# Patient Record
Sex: FEMALE | Race: BLACK OR AFRICAN AMERICAN | NOT HISPANIC OR LATINO | URBAN - METROPOLITAN AREA
[De-identification: names, ages, dates, MRNs, and addresses within clinical notes are randomized per-mention and may not be internally consistent; named-entity substitution may affect disease eponyms.]

---

## 2018-11-09 ENCOUNTER — INPATIENT (INPATIENT)
Facility: HOSPITAL | Age: 68
LOS: 1 days | Discharge: HOME | End: 2018-11-11
Attending: INTERNAL MEDICINE | Admitting: INTERNAL MEDICINE
Payer: MEDICARE

## 2018-11-09 VITALS
SYSTOLIC BLOOD PRESSURE: 134 MMHG | OXYGEN SATURATION: 100 % | TEMPERATURE: 99 F | RESPIRATION RATE: 16 BRPM | HEART RATE: 128 BPM | DIASTOLIC BLOOD PRESSURE: 68 MMHG

## 2018-11-09 DIAGNOSIS — Z95.828 PRESENCE OF OTHER VASCULAR IMPLANTS AND GRAFTS: Chronic | ICD-10-CM

## 2018-11-09 DIAGNOSIS — I26.99 OTHER PULMONARY EMBOLISM WITHOUT ACUTE COR PULMONALE: ICD-10-CM

## 2018-11-09 DIAGNOSIS — R07.9 CHEST PAIN, UNSPECIFIED: ICD-10-CM

## 2018-11-09 DIAGNOSIS — I10 ESSENTIAL (PRIMARY) HYPERTENSION: ICD-10-CM

## 2018-11-09 DIAGNOSIS — I24.9 ACUTE ISCHEMIC HEART DISEASE, UNSPECIFIED: ICD-10-CM

## 2018-11-09 LAB
ALBUMIN SERPL ELPH-MCNC: 4.5 G/DL — SIGNIFICANT CHANGE UP (ref 3.3–5)
ALP SERPL-CCNC: 102 U/L — SIGNIFICANT CHANGE UP (ref 40–120)
ALT FLD-CCNC: 14 U/L — SIGNIFICANT CHANGE UP (ref 4–33)
APTT BLD: 26.4 SEC — LOW (ref 27.5–36.3)
AST SERPL-CCNC: 23 U/L — SIGNIFICANT CHANGE UP (ref 4–32)
BILIRUB SERPL-MCNC: 0.3 MG/DL — SIGNIFICANT CHANGE UP (ref 0.2–1.2)
BUN SERPL-MCNC: 14 MG/DL — SIGNIFICANT CHANGE UP (ref 7–23)
CALCIUM SERPL-MCNC: 9.6 MG/DL — SIGNIFICANT CHANGE UP (ref 8.4–10.5)
CHLORIDE SERPL-SCNC: 103 MMOL/L — SIGNIFICANT CHANGE UP (ref 98–107)
CO2 SERPL-SCNC: 21 MMOL/L — LOW (ref 22–31)
CREAT SERPL-MCNC: 0.67 MG/DL — SIGNIFICANT CHANGE UP (ref 0.5–1.3)
GLUCOSE SERPL-MCNC: 122 MG/DL — HIGH (ref 70–99)
HCT VFR BLD CALC: 37 % — LOW (ref 39–50)
HGB BLD-MCNC: 11.7 G/DL — LOW (ref 13–17)
INR BLD: 1.04 — SIGNIFICANT CHANGE UP (ref 0.88–1.17)
MCHC RBC-ENTMCNC: 25.3 PG — LOW (ref 27–34)
MCHC RBC-ENTMCNC: 31.6 % — LOW (ref 32–36)
MCV RBC AUTO: 80.1 FL — SIGNIFICANT CHANGE UP (ref 80–100)
NRBC # FLD: 0 — SIGNIFICANT CHANGE UP
PLATELET # BLD AUTO: 201 K/UL — SIGNIFICANT CHANGE UP (ref 150–400)
PMV BLD: 11.2 FL — SIGNIFICANT CHANGE UP (ref 7–13)
POTASSIUM SERPL-MCNC: 4.2 MMOL/L — SIGNIFICANT CHANGE UP (ref 3.5–5.3)
POTASSIUM SERPL-SCNC: 4.2 MMOL/L — SIGNIFICANT CHANGE UP (ref 3.5–5.3)
PROT SERPL-MCNC: 8.4 G/DL — HIGH (ref 6–8.3)
PROTHROM AB SERPL-ACNC: 11.6 SEC — SIGNIFICANT CHANGE UP (ref 9.8–13.1)
RBC # BLD: 4.62 M/UL — SIGNIFICANT CHANGE UP (ref 4.2–5.8)
RBC # FLD: 14.6 % — HIGH (ref 10.3–14.5)
SODIUM SERPL-SCNC: 138 MMOL/L — SIGNIFICANT CHANGE UP (ref 135–145)
TROPONIN T, HIGH SENSITIVITY: 8 NG/L — SIGNIFICANT CHANGE UP (ref ?–14)
TROPONIN T, HIGH SENSITIVITY: 9 NG/L — SIGNIFICANT CHANGE UP (ref ?–14)
TSH SERPL-MCNC: 1.69 UIU/ML — SIGNIFICANT CHANGE UP (ref 0.27–4.2)
WBC # BLD: 6.05 K/UL — SIGNIFICANT CHANGE UP (ref 3.8–10.5)
WBC # FLD AUTO: 6.05 K/UL — SIGNIFICANT CHANGE UP (ref 3.8–10.5)

## 2018-11-09 PROCEDURE — 71275 CT ANGIOGRAPHY CHEST: CPT | Mod: 26

## 2018-11-09 PROCEDURE — 93970 EXTREMITY STUDY: CPT | Mod: 26

## 2018-11-09 RX ORDER — ATORVASTATIN CALCIUM 80 MG/1
20 TABLET, FILM COATED ORAL AT BEDTIME
Qty: 0 | Refills: 0 | Status: DISCONTINUED | OUTPATIENT
Start: 2018-11-09 | End: 2018-11-11

## 2018-11-09 RX ORDER — METOPROLOL TARTRATE 50 MG
25 TABLET ORAL
Qty: 0 | Refills: 0 | Status: DISCONTINUED | OUTPATIENT
Start: 2018-11-09 | End: 2018-11-11

## 2018-11-09 RX ORDER — HYDROCHLOROTHIAZIDE 25 MG
12.5 TABLET ORAL DAILY
Qty: 0 | Refills: 0 | Status: DISCONTINUED | OUTPATIENT
Start: 2018-11-10 | End: 2018-11-11

## 2018-11-09 RX ORDER — ASPIRIN/CALCIUM CARB/MAGNESIUM 324 MG
81 TABLET ORAL DAILY
Qty: 0 | Refills: 0 | Status: DISCONTINUED | OUTPATIENT
Start: 2018-11-09 | End: 2018-11-11

## 2018-11-09 RX ORDER — NIFEDIPINE 30 MG
60 TABLET, EXTENDED RELEASE 24 HR ORAL DAILY
Qty: 0 | Refills: 0 | Status: DISCONTINUED | OUTPATIENT
Start: 2018-11-10 | End: 2018-11-11

## 2018-11-09 RX ORDER — HEPARIN SODIUM 5000 [USP'U]/ML
5000 INJECTION INTRAVENOUS; SUBCUTANEOUS EVERY 8 HOURS
Qty: 0 | Refills: 0 | Status: DISCONTINUED | OUTPATIENT
Start: 2018-11-09 | End: 2018-11-11

## 2018-11-09 RX ADMIN — ATORVASTATIN CALCIUM 20 MILLIGRAM(S): 80 TABLET, FILM COATED ORAL at 21:33

## 2018-11-09 RX ADMIN — HEPARIN SODIUM 5000 UNIT(S): 5000 INJECTION INTRAVENOUS; SUBCUTANEOUS at 21:33

## 2018-11-09 RX ADMIN — Medication 25 MILLIGRAM(S): at 19:41

## 2018-11-09 NOTE — H&P ADULT - ASSESSMENT
69 yo F p/w int palp's x 3-4 days, Rt sternal CP x 1 day & SANTOS x 2 weeks    EKG- ST @ 115 flat T waves v5-6, I, L

## 2018-11-09 NOTE — H&P ADULT - HISTORY OF PRESENT ILLNESS
67 yo F p/w intermittent palp's x 3-4 days. Pt describes it as sometimes fast, sometimes slow, lasting for a few mins at a time. Pt usually feels it while doing something like walking but noticed it at rest as well. Pt also began experiencing Rt sternal CP today, described a dull, 4/10 severity, non-radiating lasting for a few minutes. Pt denies any positional, pleuritic or reproducible component to her CP. [place] some improvement after passing gas. Pt also admits to feeling SANTOS after going up 1 flight of stairs for the last 2 weeks. No orthopnea or SOB at rest. Pt does admit to noticing her BP has been rising over the last 2 days, was up to 158/113 yesterday, when cardiologist increased her nifedipine from 30mg, pt was also prescribed metoprolol for increasing HR states it was as high as 125 this morning.

## 2018-11-09 NOTE — H&P ADULT - NEGATIVE NEUROLOGICAL SYMPTOMS
no weakness/no generalized seizures/no paresthesias/no vertigo/no syncope/no difficulty walking/no focal seizures/no loss of consciousness/no hemiparesis

## 2018-11-09 NOTE — ED ADULT NURSE NOTE - OBJECTIVE STATEMENT
Pt A&Ox3. Came ambulatory to the ED with complaints of palpitations and SANTOS. Pt Sinus tachycardic on the cardiac monitor. Respirations equal, nonlabored, no sign of respiratory distress.  Breath sounds clear bilaterally. Abdomen soft, nontender, no distention noted, no pain. Denies chest pain, N/V/D. Pt denies dizziness, lightheadedness, blurry vision. Pt has history of PE, reports that symptoms feel the same as in the past. Pt stable, family at bedside will reassess.

## 2018-11-09 NOTE — H&P ADULT - PMH
DVT (deep venous thrombosis)  RLE in 2004  HTN (hypertension)    Pulmonary embolism  2004 s/p IVC filter & coumadin x 6 mos

## 2018-11-09 NOTE — H&P ADULT - NEGATIVE ENMT SYMPTOMS
no nasal congestion/no nasal discharge/no hearing difficulty/no sinus symptoms/no ear pain/no dysphagia/no tinnitus/no vertigo

## 2018-11-09 NOTE — CONSULT NOTE ADULT - SUBJECTIVE AND OBJECTIVE BOX
HISTORY OF PRESENT ILLNESS:    68 year old woman with hypertension, hypercholesterolemia, remote pulmonary embolism, who has an IVC filter in place with preserved LV systolic function on echo performed earlier this year which also demonstrated severe left atrial enlargement and no arrhythmias on a cardiac monitor that she wore earlier this year.       PAST MEDICAL & SURGICAL HISTORY:  HTN   HLD  prior PE s/p IVC filter      MEDICATIONS  (STANDING):  AT HOME   Aspirin 81 mg daily, atorvastatin 20 mg QHS, hypercholesterolemia 12.5 mg daily, and nifedipine ER 90 mg daily.       Allergies  No Known Allergies      FAMILY HISTORY:  Non-contributary for premature coronary disease or sudden cardiac death    SOCIAL HISTORY:    [ x] Non-smoker  [ ] Smoker  [x ] Alcohol      REVIEW OF SYSTEMS:  [ ]chest pain  [x  ]shortness of breath  [x  ]palpitations  [  ]syncope  [ ]near syncope [ ]upper extremity weakness   [ ] lower extremity weakness  [  ]diplopia  [  ]altered mental status   [  ]fevers  [ ]chills [ ]nausea  [ ]vomitting  [  ]dysphagia    [ ]abdominal pain  [ ]melena  [ ]BRBPR    [  ]epistaxis  [  ]rash    [ ]lower extremity edema        [x ] All others negative	  [ ] Unable to obtain    PHYSICAL EXAM:  T(C): 37.1 (11-09-18 @ 11:31), Max: 37.1 (11-09-18 @ 11:31)  HR: 128 (11-09-18 @ 11:31) (128 - 128)  BP: 134/68 (11-09-18 @ 11:31) (134/68 - 134/68)  RR: 16 (11-09-18 @ 11:31) (16 - 16)  SpO2: 100% (11-09-18 @ 11:31) (100% - 100%)  Wt(kg): --    Appearance: Normal	  HEENT:   Normal oral mucosa, PERRL, EOMI	  Lymphatic: No lymphadenopathy , no edema  Cardiovascular: Normal S1 S2, No JVD, No murmurs , Peripheral pulses palpable 2+ bilaterally  Respiratory: Lungs clear to auscultation, normal effort 	  Gastrointestinal:  Soft, Non-tender, + BS	  Skin: No rashes, No ecchymoses, No cyanosis, warm to touch  Musculoskeletal: Normal range of motion, normal strength  Psychiatry:  Mood & affect appropriate      TELEMETRY: 	  ECG:  	    Echo:   5/2018   Conclusions:   1. Normal left ventricular size with normal systolic function.   2. Mild diastolic dysfunction.   3. Normal right ventricular size and function.   4. Moderate left atrial enlargement.     NST: Normal exercise nuclear stress test 2016    Cath: n/a  	  	  LABS:	 pending    IMAGING	  CTA chest : pending         ASSESSMENT/PLAN: HISTORY OF PRESENT ILLNESS:    68 year old woman known to our office with hypertension, hypercholesterolemia, remote pulmonary embolism (2004), who has an IVC filter in place (no longer on AC)  with preserved LV systolic function on echo performed earlier this year which also demonstrated severe left atrial enlargement and no arrhythmias on a cardiac monitor that she wore earlier this year, with no ischemia on NST in 2016 and no h/o cad/stents previously presents today with complaints of intermittent palpitations as well as dyspnea on exertion. She states the symptoms have been occurring over the last week. She denies any associated chest pain, syncope or near syncope.        PAST MEDICAL & SURGICAL HISTORY:  HTN   HLD  prior PE s/p IVC filter      MEDICATIONS  (STANDING):  AT HOME   Aspirin 81 mg daily, atorvastatin 20 mg QHS, hypercholesterolemia 12.5 mg daily, and nifedipine ER 90 mg daily.       Allergies  No Known Allergies      FAMILY HISTORY:  Non-contributary for premature coronary disease or sudden cardiac death    SOCIAL HISTORY:    [ x] Non-smoker  [ ] Smoker  [x ] Alcohol      REVIEW OF SYSTEMS:  [ ]chest pain  [x  ]shortness of breath  [x  ]palpitations  [  ]syncope  [ ]near syncope [ ]upper extremity weakness   [ ] lower extremity weakness  [  ]diplopia  [  ]altered mental status   [  ]fevers  [ ]chills [ ]nausea  [ ]vomitting  [  ]dysphagia    [ ]abdominal pain  [ ]melena  [ ]BRBPR    [  ]epistaxis  [  ]rash    [ ]lower extremity edema        [x ] All others negative	  [ ] Unable to obtain    PHYSICAL EXAM:  T(C): 37.1 (11-09-18 @ 11:31), Max: 37.1 (11-09-18 @ 11:31)  HR: 128 (11-09-18 @ 11:31) (128 - 128)  BP: 134/68 (11-09-18 @ 11:31) (134/68 - 134/68)  RR: 16 (11-09-18 @ 11:31) (16 - 16)  SpO2: 100% (11-09-18 @ 11:31) (100% - 100%)  Wt(kg): --    Appearance: Normal	  HEENT:   Normal oral mucosa, PERRL, EOMI	  Lymphatic: No lymphadenopathy , no edema  Cardiovascular: Normal S1 S2, No JVD, No murmurs , Peripheral pulses palpable 2+ bilaterally  Respiratory: Lungs clear to auscultation, normal effort 	  Gastrointestinal:  Soft, Non-tender, + BS	  Skin: No rashes, No ecchymoses, No cyanosis, warm to touch  Musculoskeletal: Normal range of motion, normal strength  Psychiatry:  Mood & affect appropriate      TELEMETRY: 	NSR/-120s  ECG:  	ST no acute ischemia     Echo:   5/2018   Conclusions:   1. Normal left ventricular size with normal systolic function.   2. Mild diastolic dysfunction.   3. Normal right ventricular size and function.   4. Moderate left atrial enlargement.     NST: Normal exercise nuclear stress test 2016    Cath: n/a  	  	  LABS:	 pending    IMAGING	  CTA chest : pending         ASSESSMENT/PLAN:  68 year old woman known to our office with hypertension, hypercholesterolemia, remote pulmonary embolism (2004), who has an IVC filter in place (no longer on AC)  with preserved LV systolic function on echo performed earlier this year which also demonstrated severe left atrial enlargement and no arrhythmias on a cardiac monitor that she wore earlier this year, with no ischemia on NST in 2016 and no h/o cad/stents previously presents today with complaints of intermittent palpitations as well as dyspnea on exertion. She states the symptoms have been occurring over the last week. She denies any associated chest pain, syncope or near syncope.        -- follow up labs especially troponins and tsh  -- CTA chest pending to r/o PE given symptoms and sinus tach on admit  -- check BL LE dopplers  -- if thromboembolic work up negative, will check TTE/NST to r/o CAD  -- d/w patient/daughter at bedside  -- f/u with Dr Sage upon DC

## 2018-11-09 NOTE — ED PROVIDER NOTE - ENMT, MLM
Airway patent, Nasal mucosa clear. Mouth with normal mucosa. Throat has no vesicles, no oropharyngeal exudates and uvula is midline. no thyromegaly

## 2018-11-09 NOTE — ED ADULT NURSE REASSESSMENT NOTE - NS ED NURSE REASSESS COMMENT FT1
Pt stable, laying in bed comfortably, daughter at bedside.  Pt states palpitations aren't as bad as they were when she came in. Respirations equal, nonlabored, no sign of respiratory distress. Denies SOB. Pt going to CDU. Report given to YELENA Leal.

## 2018-11-09 NOTE — H&P ADULT - CARDIOVASCULAR SYMPTOMS
palpitations/See HPI, h/o chronic intermittent RLE edema since she had a DVT in 2004, worse with prolonged standing/chest pain/peripheral edema/dyspnea on exertion

## 2018-11-09 NOTE — ED PROVIDER NOTE - OBJECTIVE STATEMENT
68 year c/o palpitations for 3 days, occasional left sided cp, no SOB, No fever chills. Hx of PE that presented similiarly, though after accident so was immobilized. No recent travel, immobilization, cancer. On nifedipine, HCTZ lipitor. Drinks 1 cup coffee a day, though not today. no weight loss or heat intolerance.

## 2018-11-09 NOTE — H&P ADULT - PROBLEM SELECTOR PLAN 1
Admit to Telemetry, serial CE's, EKG's, FLP, continue ASA, statin, start metoprolol F/U Cardiology note, check Echo, NST

## 2018-11-10 LAB
ALBUMIN SERPL ELPH-MCNC: 3.8 G/DL — SIGNIFICANT CHANGE UP (ref 3.3–5)
ALP SERPL-CCNC: 83 U/L — SIGNIFICANT CHANGE UP (ref 40–120)
ALT FLD-CCNC: 12 U/L — SIGNIFICANT CHANGE UP (ref 4–33)
AST SERPL-CCNC: 15 U/L — SIGNIFICANT CHANGE UP (ref 4–32)
BASOPHILS # BLD AUTO: 0.02 K/UL — SIGNIFICANT CHANGE UP (ref 0–0.2)
BASOPHILS NFR BLD AUTO: 0.3 % — SIGNIFICANT CHANGE UP (ref 0–2)
BILIRUB SERPL-MCNC: 0.3 MG/DL — SIGNIFICANT CHANGE UP (ref 0.2–1.2)
BUN SERPL-MCNC: 12 MG/DL — SIGNIFICANT CHANGE UP (ref 7–23)
CALCIUM SERPL-MCNC: 9.5 MG/DL — SIGNIFICANT CHANGE UP (ref 8.4–10.5)
CHLORIDE SERPL-SCNC: 102 MMOL/L — SIGNIFICANT CHANGE UP (ref 98–107)
CHOLEST SERPL-MCNC: 238 MG/DL — HIGH (ref 120–199)
CO2 SERPL-SCNC: 22 MMOL/L — SIGNIFICANT CHANGE UP (ref 22–31)
CREAT SERPL-MCNC: 0.63 MG/DL — SIGNIFICANT CHANGE UP (ref 0.5–1.3)
EOSINOPHIL # BLD AUTO: 0.08 K/UL — SIGNIFICANT CHANGE UP (ref 0–0.5)
EOSINOPHIL NFR BLD AUTO: 1.2 % — SIGNIFICANT CHANGE UP (ref 0–6)
GLUCOSE SERPL-MCNC: 86 MG/DL — SIGNIFICANT CHANGE UP (ref 70–99)
HBA1C BLD-MCNC: 5.5 % — SIGNIFICANT CHANGE UP (ref 4–5.6)
HCT VFR BLD CALC: 33.1 % — LOW (ref 34.5–45)
HDLC SERPL-MCNC: 80 MG/DL — HIGH (ref 45–65)
HGB BLD-MCNC: 10.7 G/DL — LOW (ref 11.5–15.5)
IMM GRANULOCYTES # BLD AUTO: 0.01 # — SIGNIFICANT CHANGE UP
IMM GRANULOCYTES NFR BLD AUTO: 0.1 % — SIGNIFICANT CHANGE UP (ref 0–1.5)
LIPID PNL WITH DIRECT LDL SERPL: 164 MG/DL — SIGNIFICANT CHANGE UP
LYMPHOCYTES # BLD AUTO: 3.45 K/UL — HIGH (ref 1–3.3)
LYMPHOCYTES # BLD AUTO: 51.5 % — HIGH (ref 13–44)
MAGNESIUM SERPL-MCNC: 2.3 MG/DL — SIGNIFICANT CHANGE UP (ref 1.6–2.6)
MCHC RBC-ENTMCNC: 26 PG — LOW (ref 27–34)
MCHC RBC-ENTMCNC: 32.3 % — SIGNIFICANT CHANGE UP (ref 32–36)
MCV RBC AUTO: 80.5 FL — SIGNIFICANT CHANGE UP (ref 80–100)
MONOCYTES # BLD AUTO: 0.6 K/UL — SIGNIFICANT CHANGE UP (ref 0–0.9)
MONOCYTES NFR BLD AUTO: 9 % — SIGNIFICANT CHANGE UP (ref 2–14)
NEUTROPHILS # BLD AUTO: 2.54 K/UL — SIGNIFICANT CHANGE UP (ref 1.8–7.4)
NEUTROPHILS NFR BLD AUTO: 37.9 % — LOW (ref 43–77)
NRBC # FLD: 0 — SIGNIFICANT CHANGE UP
PHOSPHATE SERPL-MCNC: 3.7 MG/DL — SIGNIFICANT CHANGE UP (ref 2.5–4.5)
PLATELET # BLD AUTO: 189 K/UL — SIGNIFICANT CHANGE UP (ref 150–400)
PMV BLD: 11.4 FL — SIGNIFICANT CHANGE UP (ref 7–13)
POTASSIUM SERPL-MCNC: 4.2 MMOL/L — SIGNIFICANT CHANGE UP (ref 3.5–5.3)
POTASSIUM SERPL-SCNC: 4.2 MMOL/L — SIGNIFICANT CHANGE UP (ref 3.5–5.3)
PROT SERPL-MCNC: 6.8 G/DL — SIGNIFICANT CHANGE UP (ref 6–8.3)
RBC # BLD: 4.11 M/UL — SIGNIFICANT CHANGE UP (ref 3.8–5.2)
RBC # FLD: 14.7 % — HIGH (ref 10.3–14.5)
SODIUM SERPL-SCNC: 139 MMOL/L — SIGNIFICANT CHANGE UP (ref 135–145)
T4 FREE SERPL-MCNC: 1.19 NG/DL — SIGNIFICANT CHANGE UP (ref 0.9–1.8)
TRIGL SERPL-MCNC: 61 MG/DL — SIGNIFICANT CHANGE UP (ref 10–149)
TROPONIN T, HIGH SENSITIVITY: 12 NG/L — SIGNIFICANT CHANGE UP (ref ?–14)
TSH SERPL-MCNC: 1.29 UIU/ML — SIGNIFICANT CHANGE UP (ref 0.27–4.2)
WBC # BLD: 6.7 K/UL — SIGNIFICANT CHANGE UP (ref 3.8–10.5)
WBC # FLD AUTO: 6.7 K/UL — SIGNIFICANT CHANGE UP (ref 3.8–10.5)

## 2018-11-10 RX ADMIN — HEPARIN SODIUM 5000 UNIT(S): 5000 INJECTION INTRAVENOUS; SUBCUTANEOUS at 05:22

## 2018-11-10 RX ADMIN — Medication 12.5 MILLIGRAM(S): at 05:22

## 2018-11-10 RX ADMIN — Medication 25 MILLIGRAM(S): at 17:48

## 2018-11-10 RX ADMIN — HEPARIN SODIUM 5000 UNIT(S): 5000 INJECTION INTRAVENOUS; SUBCUTANEOUS at 21:38

## 2018-11-10 RX ADMIN — Medication 60 MILLIGRAM(S): at 05:21

## 2018-11-10 RX ADMIN — ATORVASTATIN CALCIUM 20 MILLIGRAM(S): 80 TABLET, FILM COATED ORAL at 21:38

## 2018-11-10 RX ADMIN — Medication 81 MILLIGRAM(S): at 17:48

## 2018-11-10 RX ADMIN — Medication 25 MILLIGRAM(S): at 05:21

## 2018-11-10 NOTE — PROGRESS NOTE ADULT - SUBJECTIVE AND OBJECTIVE BOX
Patient denies chest pain or shortness of breath.   Review of systems otherwise (-)  	  MEDICATIONS:  MEDICATIONS  (STANDING):  aspirin enteric coated 81 milliGRAM(s) Oral daily  atorvastatin 20 milliGRAM(s) Oral at bedtime  heparin  Injectable 5000 Unit(s) SubCutaneous every 8 hours  hydrochlorothiazide 12.5 milliGRAM(s) Oral daily  metoprolol tartrate 25 milliGRAM(s) Oral two times a day  NIFEdipine XL 60 milliGRAM(s) Oral daily      LABS:	 	    CARDIAC MARKERS:                                10.7   6.70  )-----------( 189      ( 10 Nov 2018 02:50 )             33.1     Hemoglobin: 10.7 g/dL (11-10 @ 02:50)  Hemoglobin: 11.7 g/dL (11-09 @ 12:18)      11-10    139  |  102  |  12  ----------------------------<  86  4.2   |  22  |  0.63    Ca    9.5      10 Nov 2018 03:39  Phos  3.7     11-10  Mg     2.3     11-10    TPro  6.8  /  Alb  3.8  /  TBili  0.3  /  DBili  x   /  AST  15  /  ALT  12  /  AlkPhos  83  11-10    Creatinine Trend: 0.63<--, 0.67<--    COAGS:       proBNP:   Lipid Profile:   HgA1c: Hemoglobin A1C, Whole Blood: 5.5 % (11-10 @ 02:50)    TSH: Thyroid Stimulating Hormone, Serum: 1.29 uIU/mL (11-10 @ 03:39)  Thyroid Stimulating Hormone, Serum: 1.69 uIU/mL (11-09 @ 12:18)        PHYSICAL EXAM:  T(C): 36.6 (11-10-18 @ 05:20), Max: 37 (11-09-18 @ 15:44)  HR: 87 (11-10-18 @ 05:20) (80 - 108)  BP: 106/64 (11-10-18 @ 05:20) (106/64 - 183/105)  RR: 18 (11-10-18 @ 05:20) (18 - 23)  SpO2: 98% (11-10-18 @ 05:20) (98% - 100%)  Wt(kg): --  I&O's Summary    Height (cm): 154.94 (11-09 @ 16:59)  Weight (kg): 82.1 (11-09 @ 16:59)  BMI (kg/m2): 34.2 (11-09 @ 16:59)  BSA (m2): 1.81 (11-09 @ 16:59)    Gen: Appears well in NAD  HEENT:  (-)icterus (-)pallor  CV: N S1 S2 1/6 TIN (+)2 Pulses B/l  Resp:  Clear to ausculatation B/L, normal effort  GI: (+) BS Soft, NT, ND  Lymph:  (-)Edema, (-)obvious lymphadenopathy  Skin: Warm to touch, Normal turgor  Psych: Appropriate mood and affect      TELEMETRY: 	  Sinus, Sinus         ASSESSMENT/PLAN: 	68y  Female known to our office with hypertension, hypercholesterolemia, remote pulmonary embolism (2004), who has an IVC filter in place (no longer on AC)  with preserved LV systolic function on echo performed earlier this year which also demonstrated severe left atrial enlargement and no arrhythmias on a cardiac monitor that she wore earlier this year, with no ischemia on NST in 2016 and no h/o cad/stents previously presents today with complaints of intermittent palpitations as well as dyspnea on exertion. She states the symptoms have been occurring over the last week. She denies any associated chest pain, syncope or near syncope.        -- follow up labs especially troponins and tsh  -- CTA chest negative  --  BL LE dopplers (-)  -- if thromboembolic work up negative, will check TTE/NST to r/o CAD    Enrico Sparks MD, FACC

## 2018-11-11 VITALS
HEART RATE: 60 BPM | DIASTOLIC BLOOD PRESSURE: 68 MMHG | SYSTOLIC BLOOD PRESSURE: 115 MMHG | RESPIRATION RATE: 18 BRPM | OXYGEN SATURATION: 100 %

## 2018-11-11 DIAGNOSIS — R91.1 SOLITARY PULMONARY NODULE: ICD-10-CM

## 2018-11-11 PROCEDURE — 93018 CV STRESS TEST I&R ONLY: CPT | Mod: GC

## 2018-11-11 PROCEDURE — 78452 HT MUSCLE IMAGE SPECT MULT: CPT | Mod: 26

## 2018-11-11 PROCEDURE — 93306 TTE W/DOPPLER COMPLETE: CPT | Mod: 26

## 2018-11-11 PROCEDURE — 93016 CV STRESS TEST SUPVJ ONLY: CPT | Mod: GC

## 2018-11-11 RX ORDER — ASPIRIN/CALCIUM CARB/MAGNESIUM 324 MG
1 TABLET ORAL
Qty: 0 | Refills: 0 | COMMUNITY
Start: 2018-11-11

## 2018-11-11 RX ORDER — ATORVASTATIN CALCIUM 80 MG/1
1 TABLET, FILM COATED ORAL
Qty: 0 | Refills: 0 | COMMUNITY
Start: 2018-11-11

## 2018-11-11 RX ORDER — METOPROLOL TARTRATE 50 MG
1 TABLET ORAL
Qty: 60 | Refills: 0 | OUTPATIENT
Start: 2018-11-11 | End: 2018-12-10

## 2018-11-11 RX ORDER — ATORVASTATIN CALCIUM 80 MG/1
1 TABLET, FILM COATED ORAL
Qty: 0 | Refills: 0 | COMMUNITY

## 2018-11-11 RX ORDER — NIFEDIPINE 30 MG
1 TABLET, EXTENDED RELEASE 24 HR ORAL
Qty: 0 | Refills: 0 | COMMUNITY
Start: 2018-11-11

## 2018-11-11 RX ORDER — ASPIRIN/CALCIUM CARB/MAGNESIUM 324 MG
1 TABLET ORAL
Qty: 0 | Refills: 0 | COMMUNITY

## 2018-11-11 RX ORDER — NIFEDIPINE 30 MG
1 TABLET, EXTENDED RELEASE 24 HR ORAL
Qty: 0 | Refills: 0 | COMMUNITY

## 2018-11-11 RX ADMIN — Medication 60 MILLIGRAM(S): at 11:28

## 2018-11-11 RX ADMIN — HEPARIN SODIUM 5000 UNIT(S): 5000 INJECTION INTRAVENOUS; SUBCUTANEOUS at 05:28

## 2018-11-11 RX ADMIN — Medication 81 MILLIGRAM(S): at 11:27

## 2018-11-11 RX ADMIN — Medication 12.5 MILLIGRAM(S): at 11:27

## 2018-11-11 RX ADMIN — HEPARIN SODIUM 5000 UNIT(S): 5000 INJECTION INTRAVENOUS; SUBCUTANEOUS at 14:22

## 2018-11-11 RX ADMIN — Medication 25 MILLIGRAM(S): at 17:15

## 2018-11-11 NOTE — DISCHARGE NOTE ADULT - PLAN OF CARE
To maintain a normal blood pressure to prevent heart attack, stroke and renal failure. Low sodium and fat diet, continue anti-hypertensive medications, and follow up with primary care physician. prevent heart disease continue present medications   f/u with cardiology in 1 week  low salt, low fat diet

## 2018-11-11 NOTE — CONSULT NOTE ADULT - SUBJECTIVE AND OBJECTIVE BOX
Patient is a 68y old  Female who presents with a chief complaint of dyspnea (09 Nov 2018 12:08)    HPI:  67 yo F p/w intermittent palp's x 3-4 days. Pt describes it as sometimes fast, sometimes slow, lasting for a few mins at a time. Pt usually feels it while doing something like walking but noticed it at rest as well. Pt also began experiencing Rt sternal CP today, described a dull, 4/10 severity, non-radiating lasting for a few minutes. Pt denies any positional, pleuritic or reproducible component to her CP. [place] some improvement after passing gas. Pt also admits to feeling SANTOS after going up 1 flight of stairs for the last 2 weeks. No orthopnea or SOB at rest. Pt does admit to noticing her BP has been rising over the last 2 days, was up to 158/113 yesterday, when cardiologist increased her nifedipine from 30mg, pt was also prescribed metoprolol for increasing HR states it was as high as 125 this morning. (09 Nov 2018 16:59)      PAST MEDICAL & SURGICAL HISTORY:  DVT (deep venous thrombosis): RLE in 2004  Pulmonary embolism: 2004 s/p IVC filter &amp; coumadin x 6 mos  HTN (hypertension)  S/P IVC filter: 2004      Review of Systems:   CONSTITUTIONAL: No fever, weight loss, or fatigue  EYES: No eye pain, visual disturbances, or discharge  ENMT:  No difficulty hearing, tinnitus, vertigo; No sinus or throat pain  NECK: No pain or stiffness  RESPIRATORY: No cough, wheezing, chills or hemoptysis; No shortness of breath  CARDIOVASCULAR: see above HPI   GASTROINTESTINAL: No abdominal or epigastric pain. No nausea, vomiting, or hematemesis; No diarrhea or constipation. No melena or hematochezia.  GENITOURINARY: No dysuria, frequency, hematuria, or incontinence  NEUROLOGICAL: No headaches, memory loss, loss of strength, numbness, or tremors  SKIN: No itching, burning, rashes, or lesions   LYMPH NODES: No enlarged glands  ENDOCRINE: No heat or cold intolerance; No hair loss  MUSCULOSKELETAL: No joint pain or swelling; No muscle, back, or extremity pain  PSYCHIATRIC: No depression, anxiety, mood swings, or difficulty sleeping  HEME/LYMPH: No easy bruising, or bleeding gums  ALLERGY AND IMMUNOLOGIC: No hives or eczema    Allergies    No Known Allergies      Social History: non smoker  no IVDA  no ETOH abuse   lives with daughter     FAMILY HISTORY:      MEDICATIONS  (STANDING):  aspirin enteric coated 81 milliGRAM(s) Oral daily  atorvastatin 20 milliGRAM(s) Oral at bedtime  heparin  Injectable 5000 Unit(s) SubCutaneous every 8 hours  hydrochlorothiazide 12.5 milliGRAM(s) Oral daily  metoprolol tartrate 25 milliGRAM(s) Oral two times a day  NIFEdipine XL 60 milliGRAM(s) Oral daily    MEDICATIONS  (PRN):      I&O's Summary    10 Nov 2018 07:01  -  11 Nov 2018 07:00  --------------------------------------------------------  IN: 360 mL / OUT: 0 mL / NET: 360 mL        PHYSICAL EXAM:  GENERAL: NAD, well-developed  HEAD:  Atraumatic, Normocephalic  EYES: EOMI, PERRLA, conjunctiva and sclera clear  NECK: Supple, No JVD  CHEST/LUNG: Clear to auscultation bilaterally; No wheeze  HEART: S1S2; soft ejection systolic murmur best heard at left sternal border no rubs, or gallops  ABDOMEN: Soft, Nontender, Nondistended; Bowel sounds present  EXTREMITIES:  + Peripheral Pulses, No clubbing or cyanosis, no edema  PSYCH: AO x 3,   NEUROLOGY: Alert, no focal motor or sensory deficits  SKIN: No rashes or lesions    LABS:                        10.7   6.70  )-----------( 189      ( 10 Nov 2018 02:50 )             33.1     11-10    139  |  102  |  12  ----------------------------<  86  4.2   |  22  |  0.63    Ca    9.5      10 Nov 2018 03:39  Phos  3.7     11-10  Mg     2.3     11-10    TPro  6.8  /  Alb  3.8  /  TBili  0.3  /  DBili  x   /  AST  15  /  ALT  12  /  AlkPhos  83  11-10    PT/INR - ( 09 Nov 2018 12:18 )   PT: 11.6 SEC;   INR: 1.04          PTT - ( 09 Nov 2018 12:18 )  PTT:26.4 SEC          RADIOLOGY & ADDITIONAL TESTS:    Consultant(s) Notes Reviewed:      Care Discussed with Consultants/Other Providers:

## 2018-11-11 NOTE — DISCHARGE NOTE ADULT - HOSPITAL COURSE
69 yo F p/w intermittent palp's x 3-4 days. Pt describes it as sometimes fast, sometimes slow, lasting for a few mins at a time. Pt usually feels it while doing something like walking but noticed it at rest as well. Pt also began experiencing Rt sternal CP today, described a dull, 4/10 severity, non-radiating lasting for a few minutes. Pt denies any positional, pleuritic or reproducible component to her CP. [place] some improvement after passing gas. Pt also admits to feeling SANTOS after going up 1 flight of stairs for the last 2 weeks. No orthopnea or SOB at rest. Pt does admit to noticing her BP has been rising over the last 2 days, was up to 158/113 yesterday, when cardiologist increased her nifedipine from 30mg, pt was also prescribed metoprolol for increasing HR states it was as high as 125 this morning.   EKG- ST @ 115 Flat T waves v5-6, I, L  CTPA- No pulmonary emboli within the main lobar and segmental branches of the pulmonary arteries. Some of the subsegmental branches are not evaluated secondary to motion artifact. No aortic aneurysm or dissection. Coronary atherosclerosis. Nonspecific 4 mm pulmonary nodule in the right upper lobe. No follow-up is required in patient without risk factors.  Trop 8  H/H- 11.7/ 37  dopplers negativr   ECHO AND STERSS TEST - NEGATIVE  STABLE FOR D/C HOME TODAY

## 2018-11-11 NOTE — DISCHARGE NOTE ADULT - CARE PLAN
Principal Discharge DX:	Atypical chest pain  Goal:	prevent heart disease  Assessment and plan of treatment:	continue present medications   f/u with cardiology in 1 week  low salt, low fat diet  Secondary Diagnosis:	Essential hypertension  Goal:	To maintain a normal blood pressure to prevent heart attack, stroke and renal failure.  Assessment and plan of treatment:	Low sodium and fat diet, continue anti-hypertensive medications, and follow up with primary care physician.

## 2018-11-11 NOTE — PROGRESS NOTE ADULT - SUBJECTIVE AND OBJECTIVE BOX
Patient denies chest pain or shortness of breath.   Review of systems otherwise (-)  	  MEDICATIONS  (STANDING):  aspirin enteric coated 81 milliGRAM(s) Oral daily  atorvastatin 20 milliGRAM(s) Oral at bedtime  heparin  Injectable 5000 Unit(s) SubCutaneous every 8 hours  hydrochlorothiazide 12.5 milliGRAM(s) Oral daily  metoprolol tartrate 25 milliGRAM(s) Oral two times a day  NIFEdipine XL 60 milliGRAM(s) Oral daily    LABS:                        10.7   6.70  )-----------( 189      ( 10 Nov 2018 02:50 )             33.1     139  |  102  |  12  ----------------------------<  86  4.2   |  22  |  0.63    Ca    9.5      10 Nov 2018 03:39  Phos  3.7     11-10  Mg     2.3     11-10    TPro  6.8  /  Alb  3.8  /  TBili  0.3  /  DBili  x   /  AST  15  /  ALT  12  /  AlkPhos  83  11-10    Creatinine Trend: 0.63<--, 0.67<--       PHYSICAL EXAM  Vital Signs Last 24 Hrs  T(C): 36.7 (11 Nov 2018 05:18), Max: 37.1 (10 Nov 2018 19:32)  T(F): 98 (11 Nov 2018 05:18), Max: 98.8 (10 Nov 2018 19:32)  HR: 86 (11 Nov 2018 13:16) (53 - 88)  BP: 133/93 (11 Nov 2018 13:16) (109/53 - 154/103)  RR: 18 (11 Nov 2018 13:16) (18 - 18)  SpO2: 100% (11 Nov 2018 13:16) (98% - 100%)    Gen: Appears well in NAD  HEENT:  (-)icterus (-)pallor  CV: N S1 S2 1/6 TIN (+)2 Pulses B/l  Resp:  Clear to ausculatation B/L, normal effort  GI: (+) BS Soft, NT, ND  Lymph:  (-)Edema, (-)obvious lymphadenopathy  Skin: Warm to touch, Normal turgor  Psych: Appropriate mood and affect    DATA:    TELEMETRY: 	  Sinus, Sinus     Trop T: 8, 9    < from: VA Duplex Ext Veins Lower Comp, Bilat. (11.09.18 @ 16:15) >  Summary/Impressions:  1.  No evidenceof deep venous thrombosis in the right and  left lower extremities.  2.  No evidence of deep and superficial venous  insufficiency noted in the right and left lower  extremities.  ------------------------------------------------------------------------  Confirmed on  11/9/2018 - 5:44 PM by SALENA Thompson    < end of copied text >    < from: CT Angio Chest w/ IV Cont (11.09.18 @ 14:22) >  IMPRESSION:     No pulmonary emboli within the main lobar and segmental branches of the   pulmonary arteries. Some of the subsegmental branches are not evaluated   secondary to motion artifact.    No aortic aneurysm or dissection.    Coronary atherosclerosis.    Nonspecific 4 mm pulmonary nodule in the right upper lobe. No follow-up   is required in patient without risk factors.    < end of copied text >        ASSESSMENT/PLAN: 	68 year old Female known to our office with hypertension, hypercholesterolemia, remote pulmonary embolism (2004), who has an IVC filter in place (no longer on AC)  with preserved LV systolic function on echo performed earlier this year which also demonstrated severe left atrial enlargement and no arrhythmias on a cardiac monitor that she wore earlier this year, with no ischemia on NST in 2016 and no h/o cad/stents previously presents today with complaints of intermittent palpitations as well as dyspnea on exertion. She states the symptoms have been occurring over the last week. She denies any associated chest pain, syncope or near syncope.      --ACS ruled out  --TSH WNL  --CTA chest negative for PE but with coronary atherosclerosis and a RUL 4 mm nodule.  Will check TTE and NST and obtain Pulm Eval (Dr Chilel)

## 2018-11-11 NOTE — CONSULT NOTE ADULT - ATTENDING COMMENTS
Patient seen and examined, agree with above assessment and plan as transcribed above.    - CTPA (-) PE  - Plan for echo and stress    Enrico Sparks MD, FACC
discussed with patient in detail, all questions answered

## 2018-11-11 NOTE — CONSULT NOTE ADULT - PROBLEM SELECTOR RECOMMENDATION 9
acceptable  continue to monitor  continue HCTZ, Nifedipine and metoprolol with hold parameters
4 mm right upper nodule: in a patient with no hx of lung disease, no cancers as well as a non smoker: Outpt follow up !

## 2018-11-11 NOTE — DISCHARGE NOTE ADULT - MEDICATION SUMMARY - MEDICATIONS TO TAKE
I will START or STAY ON the medications listed below when I get home from the hospital:    aspirin 81 mg oral delayed release tablet  -- 1 tab(s) by mouth once a day  -- Indication: For DVT (deep venous thrombosis)    atorvastatin 20 mg oral tablet  -- 1 tab(s) by mouth once a day (at bedtime)  -- Indication: For Cholesterol    metoprolol tartrate 25 mg oral tablet  -- 1 tab(s) by mouth 2 times a day  -- Indication: For HTN (hypertension)    NIFEdipine 60 mg oral tablet, extended release  -- 1 tab(s) by mouth once a day  -- Indication: For HTN (hypertension)    hydroCHLOROthiazide 12.5 mg oral capsule  -- 1 cap(s) by mouth once a day  -- Indication: For HTN (hypertension)

## 2018-11-11 NOTE — DISCHARGE NOTE ADULT - PATIENT PORTAL LINK FT
You can access the [facility] Patient Portal, offered by [facility] [place], by registering with the following website: [email or web address]

## 2018-11-11 NOTE — CONSULT NOTE ADULT - ASSESSMENT
69 yo F with a PMH DT, PE and HTN admitted with shortness of breath and palpitations x 3-4 days
67 yo F p/w int palp's x 3-4 days, Rt sternal CP x 1 day & SANTOS x 2 weeks    EKG- ST @ 115 flat T waves v5-6, I, L

## 2018-11-11 NOTE — CONSULT NOTE ADULT - SUBJECTIVE AND OBJECTIVE BOX
Patient is a 68y old  Female who presents with a chief complaint of palpitations and shortness of breath (11 Nov 2018 12:03)      HPI:  69 yo F p/w intermittent palp's x 3-4 days. Pt describes it as sometimes fast, sometimes slow, lasting for a few mins at a time. Pt usually feels it while doing something like walking but noticed it at rest as well. Pt also began experiencing Rt sternal CP today, described a dull, 4/10 severity, non-radiating lasting for a few minutes. Pt denies any positional, pleuritic or reproducible component to her CP. [place] some improvement after passing gas. Pt also admits to feeling SANTOS after going up 1 flight of stairs for the last 2 weeks. No orthopnea or SOB at rest. Pt does admit to noticing her BP has been rising over the last 2 days, was up to 158/113 yesterday, when cardiologist increased her nifedipine from 30mg, pt was also prescribed metoprolol for increasing HR states it was as high as 125 this morning. (09 Nov 2018 16:59)    She was found to hcve pulmonary nodule incidentally on ctc hest hence pulmonary called She has no underlying pulmonary diseases and denies having cough, fever or SOB : She has no underlying cancers too!!      ?FOLLOWING PRESENT  [ x] Hx of PE/DVT, [ x] Hx COPD, [ x] Hx of Asthma, [ x] Hx of Hospitalization, [x ]  Hx of BiPAP/CPAP use, [ x] Hx of GLENNA    Allergies    No Known Allergies    Intolerances        PAST MEDICAL & SURGICAL HISTORY:  DVT (deep venous thrombosis): RLE in 2004  Pulmonary embolism: 2004 s/p IVC filter &amp; coumadin x 6 mos  HTN (hypertension)  S/P IVC filter: 2004      FAMILY HISTORY:      Social History: [ x ] TOBACCO                  [ x ] ETOH                                 [ x ] IVDA/DRUGS    REVIEW OF SYSTEMS      General:	x    Skin/Breast:x  	  Ophthalmologic:x  	  ENMT:	x    Respiratory and Thorax:x  	  Cardiovascular:	Palpitations    Gastrointestinal:	x    Genitourinary:	x    Musculoskeletal:	x    Neurological:	x    Psychiatric:	x    Hematology/Lymphatics:	x    Endocrine:	x    Allergic/Immunologic:	x    MEDICATIONS  (STANDING):  aspirin enteric coated 81 milliGRAM(s) Oral daily  atorvastatin 20 milliGRAM(s) Oral at bedtime  heparin  Injectable 5000 Unit(s) SubCutaneous every 8 hours  hydrochlorothiazide 12.5 milliGRAM(s) Oral daily  metoprolol tartrate 25 milliGRAM(s) Oral two times a day  NIFEdipine XL 60 milliGRAM(s) Oral daily    MEDICATIONS  (PRN):       Vital Signs Last 24 Hrs  T(C): 36.7 (11 Nov 2018 05:18), Max: 37.1 (10 Nov 2018 19:32)  T(F): 98 (11 Nov 2018 05:18), Max: 98.8 (10 Nov 2018 19:32)  HR: 86 (11 Nov 2018 13:16) (53 - 88)  BP: 133/93 (11 Nov 2018 13:16) (109/53 - 154/103)  BP(mean): --  RR: 18 (11 Nov 2018 13:16) (18 - 18)  SpO2: 100% (11 Nov 2018 13:16) (98% - 100%)        I&O's Summary    10 Nov 2018 07:01  -  11 Nov 2018 07:00  --------------------------------------------------------  IN: 360 mL / OUT: 0 mL / NET: 360 mL        Physical Exam:   GENERAL: NAD, well-groomed, well-developed  HEENT: ROXANA/   Atraumatic, Normocephalic  ENMT: No tonsillar erythema, exudates, or enlargement; Moist mucous membranes, Good dentition, No lesions  NECK: Supple, No JVD, Normal thyroid  CHEST/LUNG: Clear to auscultation bilaterally; No rales, rhonchi, wheezing, or rubs  CVS: Regular rate and rhythm; No murmurs, rubs, or gallops  GI: : Soft, Nontender, Nondistended; Bowel sounds present  NERVOUS SYSTEM:  Alert & Oriented X3  EXTREMITIES:  2+ Peripheral Pulses, No clubbing, cyanosis, or edema  LYMPH: No lymphadenopathy noted  SKIN: No rashes or lesions  ENDOCRINOLOGY: No Thyromegaly  PSYCH: Appropriate    Labs:                              10.7   6.70  )-----------( 189      ( 10 Nov 2018 02:50 )             33.1                         11.7   6.05  )-----------( 201      ( 09 Nov 2018 12:18 )             37.0     11-10    139  |  102  |  12  ----------------------------<  86  4.2   |  22  |  0.63  11-09    138  |  103  |  14  ----------------------------<  122<H>  4.2   |  21<L>  |  0.67    Ca    9.5      10 Nov 2018 03:39  Phos  3.7     11-10  Mg     2.3     11-10    TPro  6.8  /  Alb  3.8  /  TBili  0.3  /  DBili  x   /  AST  15  /  ALT  12  /  AlkPhos  83  11-10  TPro  8.4<H>  /  Alb  4.5  /  TBili  0.3  /  DBili  x   /  AST  23  /  ALT  14  /  AlkPhos  102  11-09    CAPILLARY BLOOD GLUCOSE        LIVER FUNCTIONS - ( 10 Nov 2018 03:39 )  Alb: 3.8 g/dL / Pro: 6.8 g/dL / ALK PHOS: 83 u/L / ALT: 12 u/L / AST: 15 u/L / GGT: x               D DImer      Studies  Chest X-RAY  CT SCAN Chest   CT Abdomen  Venous Dopplers: LE:   Others    < from: CT Angio Chest w/ IV Cont (11.09.18 @ 14:22) >  diameter.    The descending aorta is tortuous at the level of the diaphragmatic. No   aortic aneurysm or dissection.    HEART: The heart is mildly enlarged. Mild left atrial rotation. No   pericardial effusion. Mild calcific atherosclerosis of LAD. No CT   evidence of right heart strain.    MEDIASTINUM AND KE: No lymphadenopathy.    CHEST WALL AND LOWER NECK: Within normal limits.    VISUALIZED UPPER ABDOMEN: Partially visualized IVC filter.    BONES: Scoliosis.    IMPRESSION:     No pulmonary emboli within the main lobar and segmental branches of the   pulmonary arteries. Some of the subsegmental branches are not evaluated   secondary to motion artifact.    No aortic aneurysm or dissection.    Coronary atherosclerosis.    Nonspecific 4 mm pulmonary nodule in the right upper lobe. No follow-up   is required in patient without risk factors.                CRISTIN ANGULO M.D., RADIOLOGY RESIDENT  This document has been electronically signed.  JENNIFER GALLEGO M.D., ATTENDING RADIOLOGIST  This document has been electronically signed. Nov 9 2018  3:02PM    < end of copied text >

## 2018-11-11 NOTE — DISCHARGE NOTE ADULT - CARE PROVIDER_API CALL
Enrico Sparks (MD), Cardiovascular Disease  [address] [address]  Phone: [phone]  Fax: [phone]

## 2019-10-23 NOTE — INPATIENT CERTIFICATION FOR MEDICARE PATIENTS - CURRENT MEDICAL NEEDS AND CARE PLANS
A&Ox1 (self).  VSS, ex HTN, 4L NC.  Ax2, however refused to get out of bed this shift and declined turn/repo this shift.  Denies pain.  Regular diet without problems.  TELE: afib with PVCs.  Mepilex to coccyx for blanchable redness, bruises noted to L toes.  Purewick in place, good urine output, no BM this shift, abdomen distended.  PIV infusing NS @ 75 mL/hr with intermittent antibiotics.  1:1 sitter at bedside for safety, frequently yells out and refuses many treatments.  Discharge pending progress.  
History of Present Illness


General


Chief Complaint:  Medical Clearance





Present Illness


HPI


Patient's 51-year-old male who presented for increased right shoulder pain.  

The patient states that he is having pain to the lateral aspect of the shoulder 

which sharp in nature.  This is worse with movement.  Patient stated that he 

was injured while being arrested.  The patient denies any current other 

locations of pain.  He states he felt like he may have torn his labrum.  The 

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  The patient 

presented for medical clearance for booking.


Allergies:  


Coded Allergies:  


     PENICILLINS (Verified  Allergy, Unknown, 3/16/18)





Patient History


Past Medical History:  see triage record


Reviewed Nursing Documentation:  PMH: Agreed, PSxH: Agreed





Nursing Documentation-PMH


Hx Hypertension:  Yes


Hx Cancer:  Yes - bone cancer





Review of Systems


All Other Systems:  negative except mentioned in HPI





Physical Exam





Vital Signs








  Date Time  Temp Pulse Resp B/P (MAP) Pulse Ox O2 Delivery O2 Flow Rate FiO2


 


3/16/18 00:28 99.3 119 14 123/88 96 Room Air  





 99.3       








General Appearance:  well appearing, no apparent distress, alert, GCS 15, non-

toxic


Head:  normocephalic, atraumatic


ENT:  hearing grossly normal, normal voice


Neck:  full range of motion, supple


Respiratory:  no respiratory distress, speaking full sentences


Musculoskeletal:  no calf tenderness, other - tenderness to right anterior 

shoulder


Neurologic:  normal inspection, alert, oriented x3, responsive, normal gait


Psychiatric:  mood/affect normal





Medical Decision Making


Diagnostic Impression:  


 Primary Impression:  


 Contusion of shoulder, right


ER Course


 Patient presented for shoulder pain.  Differential diagnoses included was not 

limited to fracture, dislocation, a.c. separation, septic joint.Patient is 

given ibuprofen.  The x-ray imaging of the right shoulder three-view 

interpreted by me showed degenerative changes without evident fracture.  

Patient was advised that he may need MRI pain persists.  The patient was 

medically cleared for booking.





Last Vital Signs








  Date Time  Temp Pulse Resp B/P (MAP) Pulse Ox O2 Delivery O2 Flow Rate FiO2


 


3/16/18 01:25 99.3       


 


3/16/18 00:55  119 14 123/88 96 Room Air  








Status:  improved


Disposition:  HOME, SELF-CARE


Condition:  Stable


Scripts


Ibuprofen* (MOTRIN*) 600 Mg Tablet


600 MG ORAL THREE TIMES A DAY, #30 TAB 0 Refills


   Prov: Edy Vu         3/16/18


Departure Forms:  [facility] Clearance


Patient Instructions:  Shoulder Pain, Contusion





Additional Instructions:  


may need mri if pain persists











Edy Vu Mar 16, 2018 02:00
Possible Home

## 2021-03-13 ENCOUNTER — EMERGENCY (EMERGENCY)
Facility: HOSPITAL | Age: 71
LOS: 1 days | Discharge: HOME | End: 2021-03-13
Attending: EMERGENCY MEDICINE | Admitting: EMERGENCY MEDICINE
Payer: MEDICARE

## 2021-03-13 VITALS
OXYGEN SATURATION: 99 % | HEIGHT: 61 IN | RESPIRATION RATE: 20 BRPM | TEMPERATURE: 100 F | DIASTOLIC BLOOD PRESSURE: 90 MMHG | SYSTOLIC BLOOD PRESSURE: 137 MMHG | HEART RATE: 105 BPM

## 2021-03-13 VITALS — OXYGEN SATURATION: 97 %

## 2021-03-13 DIAGNOSIS — Z95.828 PRESENCE OF OTHER VASCULAR IMPLANTS AND GRAFTS: Chronic | ICD-10-CM

## 2021-03-13 PROBLEM — I26.99 OTHER PULMONARY EMBOLISM WITHOUT ACUTE COR PULMONALE: Chronic | Status: ACTIVE | Noted: 2018-11-09

## 2021-03-13 PROBLEM — I82.409 ACUTE EMBOLISM AND THROMBOSIS OF UNSPECIFIED DEEP VEINS OF UNSPECIFIED LOWER EXTREMITY: Chronic | Status: ACTIVE | Noted: 2018-11-09

## 2021-03-13 PROBLEM — I10 ESSENTIAL (PRIMARY) HYPERTENSION: Chronic | Status: ACTIVE | Noted: 2018-11-09

## 2021-03-13 LAB
ALBUMIN SERPL ELPH-MCNC: 4 G/DL — SIGNIFICANT CHANGE UP (ref 3.3–5)
ALP SERPL-CCNC: 61 U/L — SIGNIFICANT CHANGE UP (ref 40–120)
ALT FLD-CCNC: 18 U/L — SIGNIFICANT CHANGE UP (ref 4–33)
ANION GAP SERPL CALC-SCNC: 15 MMOL/L — HIGH (ref 7–14)
AST SERPL-CCNC: 42 U/L — HIGH (ref 4–32)
BASOPHILS # BLD AUTO: 0 K/UL — SIGNIFICANT CHANGE UP (ref 0–0.2)
BASOPHILS NFR BLD AUTO: 0 % — SIGNIFICANT CHANGE UP (ref 0–2)
BILIRUB SERPL-MCNC: 0.4 MG/DL — SIGNIFICANT CHANGE UP (ref 0.2–1.2)
BUN SERPL-MCNC: 14 MG/DL — SIGNIFICANT CHANGE UP (ref 7–23)
CALCIUM SERPL-MCNC: 9.4 MG/DL — SIGNIFICANT CHANGE UP (ref 8.4–10.5)
CHLORIDE SERPL-SCNC: 96 MMOL/L — LOW (ref 98–107)
CO2 SERPL-SCNC: 25 MMOL/L — SIGNIFICANT CHANGE UP (ref 22–31)
CREAT SERPL-MCNC: 0.84 MG/DL — SIGNIFICANT CHANGE UP (ref 0.5–1.3)
EOSINOPHIL # BLD AUTO: 0 K/UL — SIGNIFICANT CHANGE UP (ref 0–0.5)
EOSINOPHIL NFR BLD AUTO: 0 % — SIGNIFICANT CHANGE UP (ref 0–6)
GLUCOSE SERPL-MCNC: 129 MG/DL — HIGH (ref 70–99)
HCT VFR BLD CALC: 41 % — SIGNIFICANT CHANGE UP (ref 34.5–45)
HGB BLD-MCNC: 12.5 G/DL — SIGNIFICANT CHANGE UP (ref 11.5–15.5)
IANC: 3.49 K/UL — SIGNIFICANT CHANGE UP (ref 1.5–8.5)
LYMPHOCYTES # BLD AUTO: 0.52 K/UL — LOW (ref 1–3.3)
LYMPHOCYTES # BLD AUTO: 10.7 % — LOW (ref 13–44)
MAGNESIUM SERPL-MCNC: 2.1 MG/DL — SIGNIFICANT CHANGE UP (ref 1.6–2.6)
MCHC RBC-ENTMCNC: 24.2 PG — LOW (ref 27–34)
MCHC RBC-ENTMCNC: 30.5 GM/DL — LOW (ref 32–36)
MCV RBC AUTO: 79.5 FL — LOW (ref 80–100)
MONOCYTES # BLD AUTO: 0.44 K/UL — SIGNIFICANT CHANGE UP (ref 0–0.9)
MONOCYTES NFR BLD AUTO: 8.9 % — SIGNIFICANT CHANGE UP (ref 2–14)
NEUTROPHILS # BLD AUTO: 3.85 K/UL — SIGNIFICANT CHANGE UP (ref 1.8–7.4)
NEUTROPHILS NFR BLD AUTO: 78.6 % — HIGH (ref 43–77)
PHOSPHATE SERPL-MCNC: 2.8 MG/DL — SIGNIFICANT CHANGE UP (ref 2.5–4.5)
PLATELET # BLD AUTO: 153 K/UL — SIGNIFICANT CHANGE UP (ref 150–400)
POTASSIUM SERPL-MCNC: 3.9 MMOL/L — SIGNIFICANT CHANGE UP (ref 3.5–5.3)
POTASSIUM SERPL-SCNC: 3.9 MMOL/L — SIGNIFICANT CHANGE UP (ref 3.5–5.3)
PROT SERPL-MCNC: 8.2 G/DL — SIGNIFICANT CHANGE UP (ref 6–8.3)
RBC # BLD: 5.16 M/UL — SIGNIFICANT CHANGE UP (ref 3.8–5.2)
RBC # FLD: 15.2 % — HIGH (ref 10.3–14.5)
SARS-COV-2 RNA SPEC QL NAA+PROBE: DETECTED
SODIUM SERPL-SCNC: 136 MMOL/L — SIGNIFICANT CHANGE UP (ref 135–145)
WBC # BLD: 4.9 K/UL — SIGNIFICANT CHANGE UP (ref 3.8–10.5)
WBC # FLD AUTO: 4.9 K/UL — SIGNIFICANT CHANGE UP (ref 3.8–10.5)

## 2021-03-13 PROCEDURE — 99284 EMERGENCY DEPT VISIT MOD MDM: CPT | Mod: CS

## 2021-03-13 PROCEDURE — 71045 X-RAY EXAM CHEST 1 VIEW: CPT | Mod: 26

## 2021-03-13 RX ORDER — ACETAMINOPHEN 500 MG
975 TABLET ORAL ONCE
Refills: 0 | Status: COMPLETED | OUTPATIENT
Start: 2021-03-13 | End: 2021-03-13

## 2021-03-13 RX ORDER — SODIUM CHLORIDE 9 MG/ML
1000 INJECTION, SOLUTION INTRAVENOUS ONCE
Refills: 0 | Status: COMPLETED | OUTPATIENT
Start: 2021-03-13 | End: 2021-03-13

## 2021-03-13 RX ADMIN — Medication 975 MILLIGRAM(S): at 16:38

## 2021-03-13 RX ADMIN — SODIUM CHLORIDE 1000 MILLILITER(S): 9 INJECTION, SOLUTION INTRAVENOUS at 16:38

## 2021-03-13 NOTE — ED ADULT TRIAGE NOTE - CHIEF COMPLAINT QUOTE
pt tested + COVID 19 on Friday 3/5/21 coming with 3-4 days cough, weakness, diarrhea, some SOB on exertion.  denies dizziness.

## 2021-03-13 NOTE — ED ADULT NURSE NOTE - NSIMPLEMENTINTERV_GEN_ALL_ED
Implemented All Universal Safety Interventions:  [place] to call system. Call bell, personal items and telephone within reach. Instruct patient to call for assistance. Room bathroom lighting operational. Non-slip footwear when patient is off stretcher. Physically safe environment: no spills, clutter or unnecessary equipment. Stretcher in lowest position, wheels locked, appropriate side rails in place.

## 2021-03-13 NOTE — ED PROVIDER NOTE - PHYSICAL EXAMINATION
gen: well appearing  Mentation: AAO x 3  psych: mood appropriate  ENT: airway patent  Eyes: conjunctivae clear bilaterally  Cardio: RRR, no m/r/g  Resp: normal BS b/l, no accessory muscle use  GI: s/nt/nd  : no CVA tenderness  Neuro: sensation and motor function intact  Skin: No evidence of rash  MSK: normal movement of all extremities  Lymph/Vasc: no LE edema

## 2021-03-13 NOTE — ED PROVIDER NOTE - ATTENDING CONTRIBUTION TO CARE
noni: pt here with known covid feeling weak, also with hx of DVT/PE on xaralto. py here with o2 sat 100 on RA. looks non toxic, will be hydrated, have labs chest film and ecg.  for now would hold off on ct for PE.  pt able to speak in full sentendces without resp effort.  signiong out at 4pm to next attending, pending results of all of above.    I performed a history and physical exam of the patient and discussed their management with the resident and /or advanced care provider. I reviewed the resident and /or ACP's note and agree with the documented findings and plan of care. My medical decison making and observations are found above.

## 2021-03-13 NOTE — ED PROVIDER NOTE - PATIENT PORTAL LINK FT
You can access the FollowMyHealth Patient Portal offered by [facility] by registering at the following website: [email or web address]. By joining [facility]’s FollowMyHealth portal, you will also be able to view your health information using other applications (apps) compatible with our system.

## 2021-03-13 NOTE — ED PROVIDER NOTE - NSFOLLOWUPINSTRUCTIONS_ED_ALL_ED_FT
For a 14 day period:  -Stay inside your home as much as possible, avoiding public places or public interaction  -Do not go to work  -If you do enter any public domain, at minimum wear a surgical mask at all times  -Even while indoors, attempt to remain isolated from other individuals such as family or friends, as much as possible  -Return to the Emergency Department for any symptoms such as worsening shortness of breath, significant worsening cough, high fevers despite acetaminophen (Tylenol) or ibuprofen (Motrin/Advil), or severe weakness/malaise  -You will be followed by [facility] - one of our outpatient COVID programs

## 2021-03-13 NOTE — ED PROVIDER NOTE - OBJECTIVE STATEMENT
69 y/o F with PMH of HTN, DVT/PE on xarelto presenting with 1 week of weakness. Patient COVID+ on 3/6. Initially had fevers, chills which resolved. Admits to SOB on minimal exertion, with cough. Patient has decreased appetite but no n/v, does have episodes of loose stool after eating.

## 2021-03-13 NOTE — ED PROVIDER NOTE - CLINICAL SUMMARY MEDICAL DECISION MAKING FREE TEXT BOX
noni: pt here with known covid feeling weak, also with hx of DVT/PE on xaralto. py here with o2 sat 100 on RA. looks non toxic, will be hydrated, have labs chest film and ecg.  for now would hold off on ct for PE.  pt able to speak in full sentendces without resp effort.  signiong out at 4pm to next attending, pending results of all of above.

## 2021-03-13 NOTE — ED ADULT NURSE NOTE - OBJECTIVE STATEMENT
Received pt to bed 10, A+Ox4, ambulatory, no apparent distress noted. C/O weakness r/t diarrhea x 4 days, tested + COVID 19 on Friday 3/5/21. Respirations even and unlabored, normal work of breathing, no accessory muscle use, speaking in full clear uninterrupted sentences. ABD is soft, non tender, non distended. Pt denies any chest pain, SOB, headache, dizziness, N/V/D, fever, chills.  20G to RAC, Labs sent, will continue to monitor.

## 2023-10-27 NOTE — PATIENT PROFILE ADULT - NSPROGENPREVTRANSF_GEN_A_NUR
bilateral upper extremity Passive ROM was WFL (within functional limits)/bilateral lower extremity Passive ROM was WFL (within functional limits) no

## 2024-01-19 NOTE — ED ADULT NURSE NOTE - NSSISCREENINGQ2_ED_A_ED
CHIEF COMPLAINT:   Chief Complaint   Patient presents with    New Patient Visit     Hearing loss       HISTORY OF PRESENT ILLNESS: Jose Gamez is a 80 y.o. male who presents today with symptoms of left and right ear hearing loss.  He has a history of right sided fluctuating hearing loss and has been previously seen by Dr. 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  Dr. Argueta obtained an MRI IAC, which did not demonstrate retrocochlear pathology.  He presents today to discuss his left ear.  Around Thanksgiving, he had an incident with diving, and since that time, he felt that his left ear has been muffled and with fullness in the left ear.  He has tinnitus in both ears.  He denies prior ear surgery.       PAST MEDICAL HISTORY:   Past Medical History:   Diagnosis Date    Abnormal electrocardiogram (ECG) (EKG)     Abnormal EKG    Hyperlipidemia, unspecified 11/03/2013    Hyperlipidemia    Malignant neoplasm of colon, unspecified (CMS/HCC) 03/23/2021    Colon cancer    Other abnormal glucose 01/12/2016    Blood glucose abnormal    Other specified postprocedural states     H/O colonoscopy    Personal history of other diseases of male genital organs     History of erectile dysfunction    Personal history of other diseases of the musculoskeletal system and connective tissue     Personal history of arthritis    Personal history of other diseases of the nervous system and sense organs     History of cataract    Personal history of other diseases of the nervous system and sense organs     History of migraine    Personal history of other malignant neoplasm of large intestine     History of malignant neoplasm of colon    Personal history of other malignant neoplasm of skin     History of basal cell carcinoma (BCC)    Polyp of colon     Benign colon polyp       PAST SURGICAL HISTORY:   Past Surgical History:   Procedure Laterality Date    OTHER SURGICAL HISTORY  12/18/2013    Surgery Of The Eyelids  "   OTHER SURGICAL HISTORY  07/30/2019    Excision of basal cell carcinoma    OTHER SURGICAL HISTORY  07/30/2019    Cataract surgery    OTHER SURGICAL HISTORY  07/30/2019    Vasectomy    TONSILLECTOMY  12/18/2013    Tonsillectomy    VASECTOMY  12/18/2013    Surgery Vas Deferens Vasectomy       MEDICATIONS:   Current Outpatient Medications:     amLODIPine (Norvasc) 10 mg tablet, Take 1 tablet (10 mg) by mouth once daily., Disp: , Rfl:     aspirin 81 mg EC tablet, Take 1 tablet (81 mg) by mouth once daily., Disp: , Rfl:     atorvastatin (Lipitor) 20 mg tablet, Take 1 tablet (20 mg) by mouth once daily at bedtime., Disp: , Rfl:     biotin 1 mg capsule, Take 1 capsule (1 mg) by mouth once daily., Disp: , Rfl:     butalbital-acetaminophen-caff -40 mg tablet, Take 1 tablet by mouth every 8 hours., Disp: , Rfl:     lisinopril 40 mg tablet, Take 1 tablet (40 mg) by mouth once daily., Disp: 90 tablet, Rfl: 3    multivit-min-FA-lycopen-lutein (Centrum Silver Ultra Men's) 300-600-300 mcg tablet, Take 1 tablet by mouth 1 (one) time each day., Disp: , Rfl:     azithromycin (Zithromax) 250 mg tablet, Take by mouth once daily., Disp: , Rfl:     ALLERGIES:   Allergies   Allergen Reactions    Animal Dander Unknown    Bee Pollen Unknown    Grass Pollen Unknown       SOCIAL HISTORY:   reports that he has never smoked. He has never used smokeless tobacco. He reports current alcohol use of about 14.0 standard drinks of alcohol per week. He reports that he does not use drugs.    FAMILY HISTORY: family history includes Arthritis in his mother; Asthma in his father; COPD in his mother; Fibromyalgia in his mother.    PHYSICAL EXAM:    VITALS:   Vitals:    01/18/24 0908   Resp: 16   Temp: 36.5 °C (97.7 °F)   TempSrc: Temporal   Weight: 78.5 kg (173 lb)   Height: 1.778 m (5' 10\")        GENERAL: healthy, alert, well developed, well nourished, no distress, cooperative, appears chronologic age    Communicates with normal voice and without " hearing aids.    HEAD: atraumatic, normocephalic, no lesions    EYES: normal, PERRLA and EOM's intact    EARS:   Right ear demonstrates a patent external auditory canal with a normal tympanic membrane.    Left ear: demonstrates a patent external auditory canal with an intact tympanic membrane with myringosclerosis.  He is able to auto insufflate.   Castellanos tuning fork test lateralizes midline 512 Hz.   Rinne testing with a 512 Hz tuning fork: Right Air conduction > Bone conduction   Left Air conduction > Bone conduction      NOSE: nose shows no deformity, asymmetry, or inflammation, nasal mucosa normal.    ORAL CAVITY/OROPHARYNX: negative findings: lips normal without lesions, buccal mucosa normal, gums healthy, teeth intact, non-carious, palate normal, tongue midline and normal, soft palate, uvula, and tonsils normal    NECK AND SALIVARY GLANDS: Neck is supple without masses or lymphadenopathy. Palpation of the parotid and submandibular gland reveals no masses or tenderness to palpation.    CRANIAL NERVES: intact,   Facial nerve exam  is House - Brackmann 1- Normal Function on the right and 1- Normal Function on the left.    CARDIOVASCULAR: radial pulse is palpable and regular, no evidence of peripheral edema    PULMONARY: normal lung excursion, no evidence of retractions    DATA REVIEWED:  Audiogram  I reviewed the audiogram from 1/18/2024, which demonstrated left normal sloping to moderate sensorineural hearing loss and right mild rising to near normal and sloping to moderate sensorineural hearing loss.  WRS was 76% on the right and 84% on the left.  After reviewing multiple previous audiograms, there is significant fluctuation, particularly on the right side, but mostly stable hearing in both the left and right side.     MRI scan  I reviewed both the report and images from the MRI from 1/16/2023, which did not demonstrate retrocochlear pathology    IMPRESSION:   1) Bilateral fluctuating hearing loss, right worse  than left    PLAN:  I discussed and reviewed the patient's audiogram with him and also compared to prior audiograms.  He has had a slow drop in his word recognition score on the left, but overall he is thresholds have hold steady over the last 2 years.  I also discussed that the right ear is actually improved today compared to the audiogram from November.  I discussed that on my exam, I do not appreciate a tympanic membrane perforation or evidence of effusion.  I suspect that he may have had an infection at some point, and I hope that he will improve his sensation on the left ear.  Nevertheless, I do not recommend any surgical intervention for his ear.  Further, because his hearing is relatively stable over time, I do not recommend any steroid injections or oral steroids at this time.  I would like to see him back in 3 months with an audiogram to see where his hearing ends up to make sure that his hearing is stable.    Rajendra Uriarte MD   No

## 2024-02-03 NOTE — H&P ADULT - WEIGHT IN LBS
Emergency Department APC Note    Patient: Yvonne Yanez Age: 47 year old Sex: female   MRN: [id number] : 1976 Encounter Date: 2024       History          Chief Complaint   Patient presents with    Ankle Pain       Yvonne Yanez is a 47 year old female with PMH of anemia, anxiety, fibromyalgia presents with left foot pain since yesterday.  Patient denies any known injury or accident.  Patient works in a , was started to have pain during the middle of the work but continued to work.  Patient said by the time she finished her work she was having difficulty walking on his left foot, took ibuprofen at night/midnight prior to sleep but denies any pain relief.  Patient denies any history of gout.  Denies any numbness or tingling to the left foot or toes.  Denies any prior injuries to the left foot.  Denies any calf pain.  Denies any recent travel.  Denies any bone or joint disorders.       The history is provided by the patient. No  was used.   Ankle Pain   The incident occurred yesterday. The incident occurred at work. There was no injury mechanism. The pain is present in the left foot. The quality of the pain is described as sharp. The pain is at a severity of 6/10. The pain is moderate. The pain has been Constant since onset. Associated symptoms include inability to bear weight. Pertinent negatives include no numbness, no loss of motion, no muscle weakness, no loss of sensation and no tingling. She reports no foreign bodies present. The symptoms are aggravated by activity, bearing weight and palpation. She has tried NSAIDs and ice for the symptoms. The treatment provided mild relief.       Review of Systems   Constitutional:  Negative for activity change, chills, fatigue and fever.   Eyes:  Negative for visual disturbance.   Respiratory:  Negative for cough and shortness of breath.    Cardiovascular:  Negative for chest pain.   Gastrointestinal:  Negative for abdominal pain,  constipation, diarrhea, nausea and vomiting.   Genitourinary:  Negative for difficulty urinating and dysuria.   Musculoskeletal:  Positive for arthralgias (left foot). Negative for back pain, gait problem, myalgias and neck pain.   Skin:  Negative for color change, rash and wound.   Neurological:  Negative for dizziness, tingling, weakness, light-headedness, numbness and headaches.   Psychiatric/Behavioral:  Negative for confusion.        Past Medical History:   Diagnosis Date    Anemia     Anxiety     Fibroid     Fibromyalgia     Fibromyalgia         Past Surgical History:   Procedure Laterality Date    ANES ARTHROSCOPICALLY AIDED ACL REPAIR/A Right     CARPAL TUNNEL RELEASE Left     DILATION AND CURETTAGE OF UTERUS  2015    ROBOTIC ASSISTED HYSTERECTOMY  08/15/2023    bilateral salpingectomy, ovarian cystectomy, umbilical hernia repair, removal of umbilical skin lesion    ROTATOR CUFF REPAIR Left        Family History   Problem Relation Age of Onset    Heart disease Mother     Hypertension Father     Cancer, Breast Maternal Grandmother 40    Hypertension Paternal Grandmother     Hypertension Paternal Grandfather        Social History     Tobacco Use    Smoking status: Never    Smokeless tobacco: Never   Vaping Use    Vaping Use: never used   Substance Use Topics    Alcohol use: Yes     Comment: Socially    Drug use: Never       Current Discharge Medication List        Prior to Admission Medications    Details   albuterol 108 (90 Base) MCG/ACT inhaler INHALE 2 PUFFS BY MOUTH TWICE DAILY AS NEEDED           New Prescriptions    Details   ibuprofen (MOTRIN) 600 MG tablet Take 1 tablet by mouth 3 times daily as needed for Pain.  Qty: 30 tablet, Refills: 0               Allergies   Allergen Reactions    Dog Dander Other (See Comments)    Pineapple   (Food Or Med) SWELLING    Azithromycin NAUSEA    Cat Dander Other (See Comments)       Physical Exam     ED Triage Vitals [02/03/24 1136]   ED Triage Vitals Group      [facility] [place]  97.5 °F (36.4 °C)      Heart Rate (!) 9      Resp 18      /80      SpO2 100 %      EtCO2 mmHg       Height       Weight       Weight Scale Used       BMI (Calculated)       IBW/kg (Calculated)      Visit Vitals  /86 (BP Location: RUE - Right upper extremity, Patient Position: Sitting/High-Bah's)   Pulse 81   Temp 98.2 °F (36.8 °C)   Resp 18   LMP 05/29/2023 (Approximate)   SpO2 99%        Physical Exam  Vitals and nursing note reviewed.   Constitutional:       Appearance: Normal appearance. She is not ill-appearing or toxic-appearing.   HENT:      Head: Normocephalic and atraumatic.      Neck: Normal range of motion and neck supple.   Eyes:      General: Lids are normal. Vision grossly intact. Gaze aligned appropriately.      Conjunctiva/sclera: Conjunctivae normal.   Cardiovascular:      Rate and Rhythm: Normal rate and regular rhythm.      Comments: Pedal pulses palpable bilaterally  Pulmonary:      Effort: Pulmonary effort is normal. No accessory muscle usage or respiratory distress.      Breath sounds: Normal breath sounds.   Abdominal:      General: Abdomen is flat.      Palpations: Abdomen is soft.   Musculoskeletal:      Left foot: Normal range of motion. Tenderness present. No swelling, deformity or laceration.        Legs:       Comments: Left  to palpation on the lateral aspect.  No erythema or edema noted.  No warmth noted.  No Achilles tendon  bogginess on palpation.  Negative Carrington.   Skin:     General: Skin is warm and dry.      Capillary Refill: Capillary refill takes less than 2 seconds.   Neurological:      General: No focal deficit present.      Mental Status: She is alert and oriented to person, place, and time.      GCS: GCS eye subscore is 4. GCS verbal subscore is 5. GCS motor subscore is 6.      Comments: Sensation intact to bilateral lower extremities.   Psychiatric:         Attention and Perception: Attention normal.         Mood and Affect: Mood normal.          Speech: Speech normal.         Behavior: Behavior normal. Behavior is cooperative.         Results     No results found for this visit on 02/03/24.    Results for orders placed or performed during the hospital encounter of 03/09/23   Electrocardiogram 12-Lead   Result Value Ref Range    Ventricular Rate EKG/Min (BPM) 100     Atrial Rate (BPM) 100     IA-Interval (MSEC) 144     QRS-Interval (MSEC) 88     QT-Interval (MSEC) 320     QTc 413     P Axis (Degrees) 59     R Axis (Degrees) 58     T Axis (Degrees) 36     REPORT TEXT       Normal sinus rhythm  Normal ECG  No previous ECGs available  Confirmed by TONI MONTOYA MD ([id number]) on 3/10/2023 5:56:18 PM          Imaging Results              XR FOOT 3 OR MORE VIEWS LEFT (Final result)  Result time 02/03/24 13:57:29      Final result                   Impression:    1.   Nonspecific mild soft tissue swelling in the dorsal foot. No acute  fracture or traumatic malalignment.        Electronically Signed by: AMINAH GUERRERO MD   Signed on: 2/3/2024 1:57 PM   Workstation ID: [id number]               Narrative:    EXAM DATE:  2/3/2024 1:05 PM     PROCEDURE: XR FOOT 3 OR MORE VIEWS LEFT     CLINICAL INDICATION: Age:  47 years . Gender:  Female.  History: , , r/o acute process, pain to leteral aspect, M79.672 Pain in  left foot     COMPARISON: Left foot radiographs 6/24/2011     TECHNIQUE:   AP, oblique, lateral radiographs of the left foot, 3 views     FINDINGS:  Small calcaneal spurs. There is mild soft tissue swelling in the dorsal  foot, nonspecific. Negative for acute fracture or traumatic malalignment.  No periosteal reaction. Joint spaces are maintained without arthrosis.                                       ED Course     Procedures    ED Medication Orders (From admission, onward)      None                  ED Course as of 02/03/24 1415   Sat Feb 03, 2024   1254 Heart Rate(!): 9  In error, re-vital done, correct HR= 81/min [VS]   1325 XR FOOT 3 OR MORE VIEWS  LEFT  Calcaneal spur noted. no fx. [VS]      ED Course User Index  [VS] Hudson Walsh CNP       Medical Decision Making  Ddx: Foot strain versus sprain versus fracture versus gout versus arthritis  - Yvonne Yanez is a 47 year old female with PMH of anemia, anxiety, fibromyalgia presents with left foot pain since yesterday.  Patient denies any known injury or accident.   -On evaluation patient appears stable, not in acute distress, vital signs within normal general triage, patient is able to stand but having pain on ambulation and bearing weight to the left foot.  Mild tenderness over the lateral aspect of the left foot without any erythema, edema or ecchymosis.  No concern for any neurovascular compromise to the left lower extremity.  Will get x-ray to rule out any acute process or fracture.  No concern for any gout.   Dc pending imaging.  2:13 PM -left foot x-rays negative for any fracture.  Will apply Ace wrap to left foot, give crutches for ambulation as needed.  Return precaution discussed with the patient.  Work note provided.  Follow-up with podiatry service recommended if pain persist.    Amount and/or Complexity of Data Reviewed  Radiology: ordered.    Risk  Prescription drug management.        Clinical Impression     ED Diagnosis   1. Left foot pain            Disposition     Discharge after Treatment 2/3/2024  2:14 PM  Ace wrap/crutches     Plan and return precautions discussed with the patient and the patient verbalized understanding and agreement. Please refer to discharge instructions for further details. All questions were answered to the patient's satisfaction.     This patient was seen during the novel Coronavirus, COVID-19 pandemic. There were significant changes in work flow, staffing, resource availability, disposition and practice patterns due to this pandemic (as recommended by the CDC and [employer]).    I participated in the care of this patient and this note provides information regarding their  visit. This note may have been created using voice dictation technology and may include inadvertent transcriptional errors. Any such errors should be contextually interpreted.    Note to patient: The 21st Century Cures Act makes medical notes available to patients in the interest of transparency. Please be advised this note is a medical document. Medical documents are intended to carry relevant information, convey facts as evidence, and include the clinical opinion/interpretation of the practitioner. The medical note is intended as peer to peer communication and may appear blunt or direct. It is written in medical language and may contain abbreviations or verbiage that are unfamiliar.    STANFORD Wade, MS, PhD, [facility]  Emergency Medicine  [employer]: [phone]       Hudson Walsh, CNP  02/03/24 1412     180.9

## 2024-11-30 ENCOUNTER — EMERGENCY (EMERGENCY)
Facility: HOSPITAL | Age: 74
LOS: 1 days | Discharge: HOME | End: 2024-11-30
Attending: EMERGENCY MEDICINE | Admitting: EMERGENCY MEDICINE
Payer: MEDICARE

## 2024-11-30 VITALS
DIASTOLIC BLOOD PRESSURE: 80 MMHG | SYSTOLIC BLOOD PRESSURE: 163 MMHG | HEART RATE: 63 BPM | OXYGEN SATURATION: 98 % | RESPIRATION RATE: 16 BRPM | TEMPERATURE: 98 F

## 2024-11-30 VITALS
SYSTOLIC BLOOD PRESSURE: 171 MMHG | OXYGEN SATURATION: 98 % | TEMPERATURE: 98 F | DIASTOLIC BLOOD PRESSURE: 113 MMHG | HEART RATE: 67 BPM | WEIGHT: 192.02 LBS | RESPIRATION RATE: 18 BRPM

## 2024-11-30 DIAGNOSIS — Z95.828 PRESENCE OF OTHER VASCULAR IMPLANTS AND GRAFTS: Chronic | ICD-10-CM

## 2024-11-30 PROCEDURE — 73030 X-RAY EXAM OF SHOULDER: CPT | Mod: 26,50

## 2024-11-30 PROCEDURE — 99285 EMERGENCY DEPT VISIT HI MDM: CPT | Mod: GC

## 2024-11-30 PROCEDURE — 73020 X-RAY EXAM OF SHOULDER: CPT | Mod: 26,LT,59

## 2024-11-30 PROCEDURE — 93010 ELECTROCARDIOGRAM REPORT: CPT

## 2024-11-30 PROCEDURE — 70450 CT HEAD/BRAIN W/O DYE: CPT | Mod: 26,MC

## 2024-11-30 PROCEDURE — 71046 X-RAY EXAM CHEST 2 VIEWS: CPT | Mod: 26

## 2024-11-30 PROCEDURE — 73562 X-RAY EXAM OF KNEE 3: CPT | Mod: 26,50

## 2024-11-30 PROCEDURE — 72170 X-RAY EXAM OF PELVIS: CPT | Mod: 26

## 2024-11-30 PROCEDURE — 72125 CT NECK SPINE W/O DYE: CPT | Mod: 26,MC

## 2024-11-30 RX ORDER — ACETAMINOPHEN 500MG 500 MG/1
975 TABLET, COATED ORAL ONCE
Refills: 0 | Status: COMPLETED | OUTPATIENT
Start: 2024-11-30 | End: 2024-11-30

## 2024-11-30 RX ADMIN — ACETAMINOPHEN 500MG 975 MILLIGRAM(S): 500 TABLET, COATED ORAL at 18:55

## 2024-11-30 NOTE — ED PROVIDER NOTE - ATTENDING CONTRIBUTION TO CARE
Patient with h/o PE on ac PTED after fall with multiple MSK complaints film non actionable await results of brain c spine imaging for disposition   Will reassess

## 2024-11-30 NOTE — ED ADULT NURSE NOTE - NSFALLRISKINTERV_ED_ALL_ED
Assistance OOB with selected safe patient handling equipment if applicable/Assistance with ambulation/Communicate fall risk and risk factors to all staff, patient, and family/Monitor gait and stability/Provide visual cue: yellow wristband, yellow gown, etc/Reinforce activity limits and safety measures with patient and family/Call bell, personal items and telephone in reach/Instruct patient to call for assistance before getting out of bed/chair/stretcher/Non-slip footwear applied when patient is off stretcher/[place] to call system/Physically safe environment - no spills, clutter or unnecessary equipment/Purposeful Proactive Rounding/Room/bathroom lighting operational, light cord in reach

## 2024-11-30 NOTE — ED ADULT NURSE REASSESSMENT NOTE - NS ED NURSE REASSESS COMMENT FT1
Report given by previous RN. Pt breathing is equal and nonlabored. pt not in any distress or discomfort. Pt waiting for Xray. Pt denies any pain or discomfort. Pt safety maintained

## 2024-11-30 NOTE — ED PROVIDER NOTE - NSFOLLOWUPINSTRUCTIONS_ED_ALL_ED_FT
You came to the emergency room because you fell.  There is no broken bones on your x-rays.  You have soft tissue injuries, this is equivalent to things like bruising, contusions.  Take Tylenol and/or ibuprofen per the box instructions, you will not overdose or take too much if you follow the instructions.  Reasons to come back include but are not limited to shortness of breath chest pain, or inability to walk.

## 2024-11-30 NOTE — ED PROVIDER NOTE - CLINICAL SUMMARY MEDICAL DECISION MAKING FREE TEXT BOX
Patient concerning for fractures of knees hips and shoulder will obtain x-rays patient concerning for CVA due to fall on Xarelto will obtain CT head likely disposition is to home patient is ranging all her joints and has no neurological deficits.

## 2024-11-30 NOTE — ED PROVIDER NOTE - OBJECTIVE STATEMENT
74-year-old female PMH hypertension, history of DVT PE on Xarelto, HLD, diabetes presenting after fall.  Patient states falling on Thursday tripped: [facility] and fell hurting her chest left shoulder bilateral knees and having hip pain patient has been able to ambulate after the event and is able to range all her joints with pain.  Patient denies headache midline pain shortness of breath palpitations abdominal pain dysuria hematuria hemoptysis numbness tingling.

## 2024-11-30 NOTE — ED PROVIDER NOTE - NSICDXPASTMEDICALHX_GEN_ALL_CORE_FT
PAST MEDICAL HISTORY:  DVT (deep venous thrombosis) RLE in 2004    HTN (hypertension)     Pulmonary embolism 2004 s/p IVC filter & coumadin x 6 mos

## 2024-11-30 NOTE — ED PROVIDER NOTE - PHYSICAL EXAMINATION
GENERAL: NAD, lying in bed comfortably  HEAD:  Atraumatic, normocephalic  EYES: PERRLA, conjunctiva and sclera clear  NECK: Supple, trachea midline  HEART: Regular rate and rhythm, no murmurs, rubs, or gallops  LUNGS: Unlabored respirations.  Clear to auscultation bilaterally, no crackles, wheezing, or rhonchi  ABDOMEN: Soft, nontender, nondistended, +BS  MSK: no midline tenderness, no tenderness over thorax, hips LE or UE, Pain on ROM left shoulder b/l knees  EXTREMITIES: 2+ peripheral pulses bilaterally. No clubbing, cyanosis, or edema  NERVOUS SYSTEM:  A&Ox3, moving all extremities, no focal deficits   SKIN: No rashes or lesions

## 2024-11-30 NOTE — ED PROVIDER NOTE - PATIENT PORTAL LINK FT
You can access the FollowMyHealth Patient Portal offered by [facility] by registering at the following website: [email or web address]. By joining [employer]’s FollowMyHealth portal, you will also be able to view your health information using other applications (apps) compatible with our system.

## 2024-11-30 NOTE — ED ADULT NURSE NOTE - OBJECTIVE STATEMENT
Pt received to rm 27 presents s/p trip and fall on thursday, reports falling forward hitting rt side of chest  and left knee. c/o left knee pain. denies chest pain, sob, dizziness, fever, chills, head strike, blurry vision, AC use, LOC. NSr on CM. pt hypertensive, reports taking medication this morning. Resident aware. Will await further orders and continue to monitor.

## 2024-11-30 NOTE — ED ADULT TRIAGE NOTE - CHIEF COMPLAINT QUOTE
Pt. states she had a trip and fall yesterday while walking falling forward. c/o pain to chest, left shoulder, left knee, right thigh and chin. Denies LOC, dizziness, n/v. phx: HTN, DVT on Xarelto. BP elevated in triage- compliant with meds.

## 2024-12-01 NOTE — ED POST DISCHARGE NOTE - RESULT SUMMARY
Peer Alia : There may be an acute nondisplaced fracture at the left inferior pubic ramus. Patient contact # [phone] message left with Call Back  P.A. number and hours for return call back. Alt # [phone] S/W patient's daughter discussed with daughter possible Lt inferior pubic ramus. Discussed with daughter patient needs to return to ED or follow up with Orthopedist in am. Daughter verbalizes understanding of what has been told to her.

## 2025-06-02 NOTE — INPATIENT CERTIFICATION FOR MEDICARE PATIENTS - IN ORDER TO MEET MEDICARE REQUIREMENTS.
In order to meet Medicare requirements, the clinical documentation must support the information cited in the admission order.  Please be sure to provide detailed and clear documentation about the following in the admitting note/history and physical: 36.3